# Patient Record
Sex: FEMALE | Race: WHITE | Employment: FULL TIME
[De-identification: names, ages, dates, MRNs, and addresses within clinical notes are randomized per-mention and may not be internally consistent; named-entity substitution may affect disease eponyms.]

---

## 2021-09-27 ENCOUNTER — NURSE TRIAGE (OUTPATIENT)
Dept: OTHER | Facility: CLINIC | Age: 31
End: 2021-09-27

## 2021-09-27 NOTE — TELEPHONE ENCOUNTER
Reason for Disposition   Patient wants to be seen    Answer Assessment - Initial Assessment Questions  1. DESCRIPTION: \"Describe your dizziness. \"      States feels a bit disoriented- laying down right now and feels a bit nauseated and woozy    2. LIGHTHEADED: \"Do you feel lightheaded? \" (e.g., somewhat faint, woozy, weak upon standing)      Woozy    3. VERTIGO: \"Do you feel like either you or the room is spinning or tilting? \" (i.e. vertigo)      Denies    4. SEVERITY: \"How bad is it? \"  \"Do you feel like you are going to faint? \" \"Can you stand and walk? \"    - MILD - walking normally    - MODERATE - interferes with normal activities (e.g., work, school)     - SEVERE - unable to stand, requires support to walk, feels like passing out now. Mild    5. ONSET:  \"When did the dizziness begin? \"      Today. States had an episode like this a couple of months ago, laid down and took a nap and felt better    6. AGGRAVATING FACTORS: \"Does anything make it worse? \" (e.g., standing, change in head position)      Shaking head    7. HEART RATE: \"Can you tell me your heart rate? \" \"How many beats in 15 seconds? \"  (Note: not all patients can do this)        65 on pulse oximeter    8. CAUSE: \"What do you think is causing the dizziness? \"      Unsure    9. RECURRENT SYMPTOM: \"Have you had dizziness before? \" If so, ask: \"When was the last time? \" \"What happened that time? \"      Did have an episode like this a few months ago- see above    10. OTHER SYMPTOMS: \"Do you have any other symptoms? \" (e.g., fever, chest pain, vomiting, diarrhea, bleeding)        Nausea    11. PREGNANCY: \"Is there any chance you are pregnant? \" \"When was your last menstrual period? \"        LMP - currently on    Protocols used: DIZZINESS-ADULT-OH    Received call from Jim Chin at [facility] with Red Flag Complaint.     Brief description of triage: Patient has been experiencing headaches and has had several episodes of light headedness over last couple of

## 2023-07-25 ENCOUNTER — TELEPHONE (OUTPATIENT)
Dept: FAMILY MEDICINE CLINIC | Age: 33
End: 2023-07-25

## 2023-07-25 NOTE — TELEPHONE ENCOUNTER
Wojciech Argueta called and lm that she wants to be a new pt of Dr. Allyson Neri. She had an appt back in 2018 but she cx. Called her and lm that we need to know how early she can be here. I do have a few openings at 9 am where I can squeeze her in. Asked for a call back.

## 2023-08-28 SDOH — HEALTH STABILITY: PHYSICAL HEALTH: ON AVERAGE, HOW MANY DAYS PER WEEK DO YOU ENGAGE IN MODERATE TO STRENUOUS EXERCISE (LIKE A BRISK WALK)?: 2 DAYS

## 2023-08-28 SDOH — HEALTH STABILITY: PHYSICAL HEALTH: ON AVERAGE, HOW MANY MINUTES DO YOU ENGAGE IN EXERCISE AT THIS LEVEL?: 20 MIN

## 2023-08-28 ASSESSMENT — SOCIAL DETERMINANTS OF HEALTH (SDOH)
WITHIN THE LAST YEAR, HAVE YOU BEEN KICKED, HIT, SLAPPED, OR OTHERWISE PHYSICALLY HURT BY YOUR PARTNER OR EX-PARTNER?: NO
WITHIN THE LAST YEAR, HAVE YOU BEEN KICKED, HIT, SLAPPED, OR OTHERWISE PHYSICALLY HURT BY YOUR PARTNER OR EX-PARTNER?: NO
WITHIN THE LAST YEAR, HAVE TO BEEN RAPED OR FORCED TO HAVE ANY KIND OF SEXUAL ACTIVITY BY YOUR PARTNER OR EX-PARTNER?: NO
WITHIN THE LAST YEAR, HAVE YOU BEEN HUMILIATED OR EMOTIONALLY ABUSED IN OTHER WAYS BY YOUR PARTNER OR EX-PARTNER?: NO
WITHIN THE LAST YEAR, HAVE YOU BEEN AFRAID OF YOUR PARTNER OR EX-PARTNER?: NO
WITHIN THE LAST YEAR, HAVE YOU BEEN HUMILIATED OR EMOTIONALLY ABUSED IN OTHER WAYS BY YOUR PARTNER OR EX-PARTNER?: NO
WITHIN THE LAST YEAR, HAVE YOU BEEN AFRAID OF YOUR PARTNER OR EX-PARTNER?: NO
WITHIN THE LAST YEAR, HAVE TO BEEN RAPED OR FORCED TO HAVE ANY KIND OF SEXUAL ACTIVITY BY YOUR PARTNER OR EX-PARTNER?: NO

## 2023-08-29 ENCOUNTER — OFFICE VISIT (OUTPATIENT)
Dept: FAMILY MEDICINE CLINIC | Age: 33
End: 2023-08-29
Payer: COMMERCIAL

## 2023-08-29 VITALS
TEMPERATURE: 98.4 F | OXYGEN SATURATION: 96 % | SYSTOLIC BLOOD PRESSURE: 110 MMHG | HEIGHT: 69 IN | HEART RATE: 70 BPM | RESPIRATION RATE: 16 BRPM | DIASTOLIC BLOOD PRESSURE: 72 MMHG | WEIGHT: 165.2 LBS | BODY MASS INDEX: 24.47 KG/M2

## 2023-08-29 VITALS
OXYGEN SATURATION: 97 % | SYSTOLIC BLOOD PRESSURE: 90 MMHG | WEIGHT: 163.8 LBS | DIASTOLIC BLOOD PRESSURE: 60 MMHG | TEMPERATURE: 97.8 F | RESPIRATION RATE: 10 BRPM | HEIGHT: 69 IN | HEART RATE: 70 BPM | BODY MASS INDEX: 24.26 KG/M2

## 2023-08-29 DIAGNOSIS — M99.01 CERVICAL (NECK) REGION SOMATIC DYSFUNCTION: ICD-10-CM

## 2023-08-29 DIAGNOSIS — M25.511 ACUTE PAIN OF RIGHT SHOULDER: Primary | ICD-10-CM

## 2023-08-29 DIAGNOSIS — M99.08 SEGMENTAL AND SOMATIC DYSFUNCTION OF RIB CAGE: ICD-10-CM

## 2023-08-29 DIAGNOSIS — K59.01 SLOW TRANSIT CONSTIPATION: ICD-10-CM

## 2023-08-29 DIAGNOSIS — F41.9 ANXIETY: ICD-10-CM

## 2023-08-29 DIAGNOSIS — M79.643 HAND AND FOOT PAIN, UNSPECIFIED LATERALITY: ICD-10-CM

## 2023-08-29 DIAGNOSIS — M79.673 HAND AND FOOT PAIN, UNSPECIFIED LATERALITY: ICD-10-CM

## 2023-08-29 DIAGNOSIS — K58.9 IRRITABLE BOWEL SYNDROME WITHOUT DIARRHEA: ICD-10-CM

## 2023-08-29 DIAGNOSIS — R29.898 WEAKNESS OF BOTH HANDS: ICD-10-CM

## 2023-08-29 DIAGNOSIS — M99.02 SEGMENTAL DYSFUNCTION OF THORACIC REGION: ICD-10-CM

## 2023-08-29 DIAGNOSIS — N94.6 DYSMENORRHEA: ICD-10-CM

## 2023-08-29 DIAGNOSIS — R42 DIZZINESS: ICD-10-CM

## 2023-08-29 DIAGNOSIS — M51.37 DDD (DEGENERATIVE DISC DISEASE), LUMBOSACRAL: ICD-10-CM

## 2023-08-29 DIAGNOSIS — E55.9 VITAMIN D DEFICIENCY: ICD-10-CM

## 2023-08-29 DIAGNOSIS — Z79.899 MEDICATION MANAGEMENT: ICD-10-CM

## 2023-08-29 DIAGNOSIS — Z71.89 ENCOUNTER FOR HERB AND VITAMIN SUPPLEMENT MANAGEMENT: ICD-10-CM

## 2023-08-29 DIAGNOSIS — K60.1 CHRONIC ANAL FISSURE: Primary | ICD-10-CM

## 2023-08-29 DIAGNOSIS — R20.2 PARESTHESIA: ICD-10-CM

## 2023-08-29 DIAGNOSIS — Z86.69 H/O BELL'S PALSY: ICD-10-CM

## 2023-08-29 DIAGNOSIS — F41.1 GENERALIZED ANXIETY DISORDER: ICD-10-CM

## 2023-08-29 DIAGNOSIS — R76.8 IGG GLIADIN ANTIBODY POSITIVE: ICD-10-CM

## 2023-08-29 PROCEDURE — 98926 OSTEOPATH MANJ 3-4 REGIONS: CPT | Performed by: FAMILY MEDICINE

## 2023-08-29 PROCEDURE — 99213 OFFICE O/P EST LOW 20 MIN: CPT | Performed by: FAMILY MEDICINE

## 2023-08-29 PROCEDURE — 99203 OFFICE O/P NEW LOW 30 MIN: CPT | Performed by: FAMILY MEDICINE

## 2023-08-29 RX ORDER — FLUOXETINE HYDROCHLORIDE 20 MG/1
20 CAPSULE ORAL DAILY
Qty: 30 CAPSULE | Refills: 2 | Status: SHIPPED | OUTPATIENT
Start: 2023-08-29

## 2023-08-29 SDOH — ECONOMIC STABILITY: INCOME INSECURITY: HOW HARD IS IT FOR YOU TO PAY FOR THE VERY BASICS LIKE FOOD, HOUSING, MEDICAL CARE, AND HEATING?: NOT VERY HARD

## 2023-08-29 SDOH — ECONOMIC STABILITY: HOUSING INSECURITY
IN THE LAST 12 MONTHS, WAS THERE A TIME WHEN YOU DID NOT HAVE A STEADY PLACE TO SLEEP OR SLEPT IN A SHELTER (INCLUDING NOW)?: NO

## 2023-08-29 SDOH — ECONOMIC STABILITY: FOOD INSECURITY: WITHIN THE PAST 12 MONTHS, THE FOOD YOU BOUGHT JUST DIDN'T LAST AND YOU DIDN'T HAVE MONEY TO GET MORE.: NEVER TRUE

## 2023-08-29 SDOH — ECONOMIC STABILITY: FOOD INSECURITY: WITHIN THE PAST 12 MONTHS, YOU WORRIED THAT YOUR FOOD WOULD RUN OUT BEFORE YOU GOT MONEY TO BUY MORE.: NEVER TRUE

## 2023-08-29 ASSESSMENT — PATIENT HEALTH QUESTIONNAIRE - PHQ9
SUM OF ALL RESPONSES TO PHQ9 QUESTIONS 1 & 2: 0
SUM OF ALL RESPONSES TO PHQ QUESTIONS 1-9: 0
1. LITTLE INTEREST OR PLEASURE IN DOING THINGS: 0
2. FEELING DOWN, DEPRESSED OR HOPELESS: 0
SUM OF ALL RESPONSES TO PHQ QUESTIONS 1-9: 0

## 2023-08-29 ASSESSMENT — ENCOUNTER SYMPTOMS: BACK PAIN: 1

## 2023-08-29 NOTE — PROGRESS NOTES
Sharla Rojo (:  1990) is a 35 y.o. 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 Cardiovascular:  Negative for chest pain and leg swelling. Genitourinary:         No bowel or bladder changes   Musculoskeletal:  Positive for back pain. Negative for arthralgias and myalgias. Neurological:  Positive for headaches. Negative for dizziness. Objective   Physical Exam  Constitutional:       Appearance: Normal appearance. She is normal weight. HENT:      Head: Normocephalic and atraumatic. Cardiovascular:      Rate and Rhythm: Normal rate and regular rhythm. Pulmonary:      Effort: Pulmonary effort is normal.      Breath sounds: Normal breath sounds. Musculoskeletal:      Comments: MS:  Mild discomfort with internal and external rotation but no weakness noted, [id number], posterior 2nd to 4th ribs on the right, [id number], spasm upper thoracic on the right     Neurological:      Mental Status: She is alert. ASSESSMENT/PLAN:  1. Acute pain of right shoulder  2. Medication management  3. Anxiety  -     FLUoxetine (PROZAC) 20 MG capsule; Take 1 capsule by mouth daily, Disp-30 capsule, R-2Normal  4. Segmental dysfunction of thoracic region  5. Segmental and somatic dysfunction of rib cage  6. Cervical (neck) region somatic dysfunction  OMT 3 regions. HVLA-thoracic. ME-cervical.   Myofascial-thoracic. Still rib. Patient tolerated well.

## 2023-08-29 NOTE — PROGRESS NOTES
was spent with the patient face to face on this visit; of which >50% was for counseling and coordination of care, as well as the time spent before and after the visit reviewing the chart, documenting the encounter, reviewing labs,reports, NIH listed studies, making phone calls, etc.      Patients food and drinks were reviewed with the patient,   - They will bring a food drink symptom log to future visits for inclusion in their record    - 75 better food list reviewed & given to patient along with the omega 6 food list to avoid      - Gluten in corn and oats abstracts sheet reviewed and given to the patient today    - 23 Foods containing Latex-like proteins was reviewed and copy to be taken if desired     - Nutrient Supplements list provided and copyto be taken if desired    - [id number]. [employer] web site offered to patient to review at their convenience by staff with login information    Note:  I have discussed with the patient that with all nutraceuticals, there is often mixed data and emerging research which needs to be monitored; as well as an array of NIH fact sheets on nutrients and supplements, available at www.nih,issue plus [employer]. [employer] plus www.lpi,org. If I have recommended cinnamon at the request of this patient to assist them in control of their blood sugar, triglyceride, and/or weight issues. I discussed that the patient's clinical use of cinnamon bark, calcium, magnesium, Vitamin D, and pharmaceutical grade [employer] omega 3 oil or triple-strength fish oil, and B-50/B-100 time-released B-complex by Jose Hernandez will be for a time-limited trial to determine their individual effectiveness and safety in this patient. I also referred the patient to the NMCD: Nutrition, Metabolism, and Cardiovascular Diseases (SecuritiesCard.pl) and concerns about long-term use and hepatotoxicity of cinnamon and other nutrients.   I suggested they frequently search nih.gov for the latest

## 2023-10-17 LAB
ABSOLUTE BASO #: 0.05 K/UL (ref 0–0.2)
ABSOLUTE EOS #: 0.03 K/UL (ref 0–0.5)
ABSOLUTE LYMPH #: 1.6 K/UL (ref 1–4)
ABSOLUTE MONO #: 0.28 K/UL (ref 0.2–1)
ABSOLUTE NEUT #: 1.36 K/UL (ref 1.5–7.5)
ALBUMIN SERPL-MCNC: 5 G/DL (ref 3.5–5.2)
ALK PHOSPHATASE: 84 U/L (ref 40–114)
ALT SERPL-CCNC: 17 U/L (ref 5–40)
AMYLASE: 53 U/L (ref 28–100)
ANION GAP SERPL CALCULATED.3IONS-SCNC: 11 MEQ/L (ref 7–16)
AST SERPL-CCNC: 18 U/L (ref 9–40)
AVERAGE GLUCOSE: 111 MG/DL
BASOPHILS RELATIVE PERCENT: 1.5 %
BILIRUB SERPL-MCNC: 0.3 MG/DL
BILIRUBIN DIRECT: <0.2 MG/DL (ref 0–0.3)
BUN BLDV-MCNC: 10 MG/DL (ref 6–20)
CALCIUM SERPL-MCNC: 9.2 MG/DL (ref 8.5–10.5)
CHLORIDE BLD-SCNC: 104 MEQ/L (ref 95–107)
CHOLESTEROL/HDL RATIO: 2.6 RATIO
CHOLESTEROL: 168 MG/DL
CO2: 23 MEQ/L (ref 19–31)
COMMENT: ABNORMAL
CREAT SERPL-MCNC: 0.7 MG/DL (ref 0.6–1.3)
EGFR IF NONAFRICAN AMERICAN: 117 ML/MIN/1.73
EOSINOPHILS RELATIVE PERCENT: 0.9 %
ESTRADIOL LEVEL: 46 PG/ML
FOLATE: 13 UG/L
FOLLICLE STIMULATING HORMONE: 2.6 IU/L
GLUCOSE: 93 MG/DL (ref 70–99)
HBA1C MFR BLD: 5.5 % (ref 4.2–5.6)
HCT VFR BLD CALC: 42 % (ref 34–45)
HDLC SERPL-MCNC: 64 MG/DL
HEMOGLOBIN: 14.6 G/DL (ref 11.5–15.5)
HIGH SENSITIVE C-REACTIVE PROTEIN: 1.1 MG/L
IRON SATURATION: 24 % (ref 20–50)
IRON, SERUM: 74 UG/DL (ref 37–145)
LDL CHOLESTEROL CALCULATED: 97 MG/DL
LDL/HDL RATIO: 1.5 RATIO
LH: 1.1 IU/L
LIPASE: 35 U/L (ref 13–60)
LYMPHOCYTE %: 48 %
MAGNESIUM: 2.1 MG/DL (ref 1.6–2.6)
MCH RBC QN AUTO: 29.4 PG (ref 25–33)
MCHC RBC AUTO-ENTMCNC: 34.8 G/DL (ref 31–36)
MCV RBC AUTO: 84.5 FL (ref 80–99)
MONOCYTES # BLD: 8.4 %
NEUTROPHILS RELATIVE PERCENT: 40.9 %
PDW BLD-RTO: 12.4 % (ref 11.5–15)
PLATELETS: 201 K/UL (ref 130–400)
PMV BLD AUTO: 11.1 FL (ref 9.3–13)
POTASSIUM SERPL-SCNC: 4.4 MEQ/L (ref 3.5–5.4)
PROGESTERONE LEVEL: 2.15 NG/ML
RBC: 4.97 M/UL (ref 3.8–5.4)
RHEUMATOID FACTOR: 13 IU/ML
SEDIMENTATION RATE, ERYTHROCYTE: 7 MM/HR (ref 0–20)
SODIUM BLD-SCNC: 138 MEQ/L (ref 133–146)
T3 FREE: 3 PG/ML (ref 2.2–4.2)
T3 TOTAL: 95 NG/DL (ref 80–200)
T4 FREE: 1.13 NG/DL (ref 0.8–1.9)
T4 TOTAL: 6.4 UG/DL (ref 4.5–10.5)
TOTAL IRON BINDING CAPACITY: 307 UG/DL (ref 250–450)
TOTAL PROTEIN: 6.9 G/DL (ref 6.1–8.3)
TRIGL SERPL-MCNC: 33 MG/DL
TSH SERPL DL<=0.05 MIU/L-ACNC: 1.69 UIU/ML (ref 0.4–4.1)
UNSATURATED IRON BINDING CAPACITY: 233 UG/DL (ref 112–347)
URIC ACID: 4.8 MG/DL (ref 2.7–6.1)
VITAMIN B-12: 456 PG/ML (ref 200–950)
VITAMIN D 25-HYDROXY: 34 NG/ML
VLDLC SERPL CALC-MCNC: 7 MG/DL
WBC: 3.3 K/UL (ref 3.5–11)

## 2023-10-19 LAB
ANTI-THYROGLOB ABS: 17 IU/ML
DHEAS (DHEA SULFATE): 124 UG/DL (ref 99–340)
GLIADIN ANTIBODIES IGA: <1 U/ML
GLIADIN ANTIBODIES IGG: <1 U/ML
HOMOCYSTINE, SERUM: 7 UMOL/L
PARATHYROID HORMONE INTACT: 80 PG/ML (ref 15–65)
THYROGLOBULIN: 8.7 NG/ML
THYROID PEROXIDASE ANTIBODY: 10 IU/ML
TISSUE TRANSGLUTAMINASE IGA: <1 U/ML
TTG, IGG: <1 U/ML

## 2023-10-21 LAB
ALBUMIN SERUM: 4.8 G/DL (ref 3.6–5.1)
DEHYDROEPIANDROSTERONE: 8.9 NG/ML (ref 1.3–9.8)
SEX HORMONE BINDING GLOBULIN: 74.2 NMOL/L (ref 24.6–122)
TESTOSTERONE FREE: 1.1 PG/ML (ref 1.3–9.2)
TESTOSTERONE, LCMS: 11 NG/DL (ref 9–55)

## 2023-10-29 ENCOUNTER — PATIENT MESSAGE (OUTPATIENT)
Dept: FAMILY MEDICINE CLINIC | Age: 33
End: 2023-10-29

## 2023-10-30 RX ORDER — CEPHALEXIN 500 MG/1
500 CAPSULE ORAL 2 TIMES DAILY
Qty: 20 CAPSULE | Refills: 0 | Status: SHIPPED | OUTPATIENT
Start: 2023-10-30 | End: 2023-11-09

## 2023-10-30 NOTE — TELEPHONE ENCOUNTER
From: Santana Montoya  To: Dr. Alexus Brown: 10/29/2023 7:42 AM EDT  Subject: Prescription    Good Morning! I wanted to see if I could get some medicine. I think I am getting what the girls had. Starting with a cough, stuffy nose, losing my voice and sore throat. Just let me know. Thanks!

## 2024-04-01 ENCOUNTER — NURSE ONLY (OUTPATIENT)
Dept: LAB | Age: 34
End: 2024-04-01

## 2024-04-12 LAB — CYTOLOGY THIN PREP PAP: NORMAL

## 2024-05-23 ENCOUNTER — OFFICE VISIT (OUTPATIENT)
Dept: FAMILY MEDICINE CLINIC | Age: 34
End: 2024-05-23
Payer: COMMERCIAL

## 2024-05-23 VITALS
TEMPERATURE: 97.3 F | RESPIRATION RATE: 16 BRPM | SYSTOLIC BLOOD PRESSURE: 124 MMHG | DIASTOLIC BLOOD PRESSURE: 78 MMHG | HEIGHT: 69 IN | BODY MASS INDEX: 26.25 KG/M2 | OXYGEN SATURATION: 98 % | WEIGHT: 177.2 LBS | HEART RATE: 67 BPM

## 2024-05-23 DIAGNOSIS — J02.9 ACUTE PHARYNGITIS, UNSPECIFIED ETIOLOGY: Primary | ICD-10-CM

## 2024-05-23 PROCEDURE — 99213 OFFICE O/P EST LOW 20 MIN: CPT | Performed by: FAMILY MEDICINE

## 2024-05-23 RX ORDER — AMOXICILLIN AND CLAVULANATE POTASSIUM 875; 125 MG/1; MG/1
1 TABLET, FILM COATED ORAL 2 TIMES DAILY
Qty: 20 TABLET | Refills: 0 | Status: SHIPPED | OUTPATIENT
Start: 2024-05-23 | End: 2024-06-02

## 2024-05-23 ASSESSMENT — ENCOUNTER SYMPTOMS
CONSTIPATION: 0
SORE THROAT: 1
SINUS PRESSURE: 0
SINUS PAIN: 0
DIARRHEA: 0
RHINORRHEA: 0
SHORTNESS OF BREATH: 0
NAUSEA: 0
VOICE CHANGE: 0
CHEST TIGHTNESS: 0
TROUBLE SWALLOWING: 0
VOMITING: 0

## 2024-05-23 ASSESSMENT — PATIENT HEALTH QUESTIONNAIRE - PHQ9
SUM OF ALL RESPONSES TO PHQ QUESTIONS 1-9: 0
2. FEELING DOWN, DEPRESSED OR HOPELESS: NOT AT ALL
SUM OF ALL RESPONSES TO PHQ QUESTIONS 1-9: 0
SUM OF ALL RESPONSES TO PHQ9 QUESTIONS 1 & 2: 0
1. LITTLE INTEREST OR PLEASURE IN DOING THINGS: NOT AT ALL
SUM OF ALL RESPONSES TO PHQ QUESTIONS 1-9: 0
SUM OF ALL RESPONSES TO PHQ QUESTIONS 1-9: 0

## 2024-05-23 NOTE — PROGRESS NOTES
Mague Pinto (:  1990) is a 33 y.o. female,Established patient, here for evaluation of the following chief complaint(s):  Pharyngitis and Otalgia      Subjective   SUBJECTIVE/OBJECTIVE:  HPI  Patient presents with sore throat and into L ear/glands for 3 days  Known exposures  with strep last week  Treatment salt water gargles, Tylenol, pushing water  Better mild help with the Tylenol  Worse when swallowing    Review of Systems   Constitutional:  Positive for fatigue. Negative for activity change, chills and fever.   HENT:  Positive for ear pain (left sided) and sore throat. Negative for congestion, postnasal drip, rhinorrhea, sinus pressure, sinus pain, sneezing, trouble swallowing (hurts to swallow) and voice change.    Respiratory:  Negative for chest tightness and shortness of breath.    Cardiovascular:  Negative for chest pain and leg swelling.   Gastrointestinal:  Negative for constipation, diarrhea, nausea and vomiting.   Musculoskeletal:  Negative for arthralgias.   Skin:  Negative for rash.   Neurological:  Negative for dizziness, light-headedness and headaches.   Hematological:  Positive for adenopathy (left side of neck).          Objective   Physical Exam  Vitals reviewed.   Constitutional:       General: She is not in acute distress.     Appearance: Normal appearance. She is normal weight. She is not ill-appearing.   HENT:      Head: Normocephalic and atraumatic.      Right Ear: No middle ear effusion.      Left Ear:  No middle ear effusion.      Nose: No mucosal edema, congestion or rhinorrhea.      Right Turbinates: Not swollen.      Left Turbinates: Not swollen.      Right Sinus: No maxillary sinus tenderness or frontal sinus tenderness.      Left Sinus: No maxillary sinus tenderness or frontal sinus tenderness.      Mouth/Throat:      Pharynx: Posterior oropharyngeal erythema present. No oropharyngeal exudate.   Eyes:      Extraocular Movements: Extraocular movements intact.

## 2024-07-03 ENCOUNTER — OFFICE VISIT (OUTPATIENT)
Dept: FAMILY MEDICINE CLINIC | Age: 34
End: 2024-07-03
Payer: COMMERCIAL

## 2024-07-03 VITALS
WEIGHT: 168.2 LBS | DIASTOLIC BLOOD PRESSURE: 64 MMHG | RESPIRATION RATE: 16 BRPM | SYSTOLIC BLOOD PRESSURE: 110 MMHG | OXYGEN SATURATION: 98 % | HEIGHT: 69 IN | TEMPERATURE: 97.7 F | BODY MASS INDEX: 24.91 KG/M2 | HEART RATE: 81 BPM

## 2024-07-03 DIAGNOSIS — M62.838 MUSCLE SPASM: ICD-10-CM

## 2024-07-03 DIAGNOSIS — M99.08 SEGMENTAL AND SOMATIC DYSFUNCTION OF RIB CAGE: ICD-10-CM

## 2024-07-03 DIAGNOSIS — M99.02 SEGMENTAL DYSFUNCTION OF THORACIC REGION: ICD-10-CM

## 2024-07-03 DIAGNOSIS — G62.9 NEUROPATHY: Primary | ICD-10-CM

## 2024-07-03 PROCEDURE — 99213 OFFICE O/P EST LOW 20 MIN: CPT | Performed by: FAMILY MEDICINE

## 2024-07-03 PROCEDURE — 98925 OSTEOPATH MANJ 1-2 REGIONS: CPT | Performed by: FAMILY MEDICINE

## 2024-07-03 ASSESSMENT — ENCOUNTER SYMPTOMS: BACK PAIN: 1

## 2024-07-03 NOTE — PROGRESS NOTES
Mague Pinto (:  1990) is a 33 y.o. female,Established patient, here for evaluation of the following chief complaint(s):  Extremity Weakness (Left hand)      Subjective   SUBJECTIVE/OBJECTIVE:  HPI  Pain located L hand, also would like to have R upper back manipulated  Pain started several months  Notices more discomfort in fingers on the volar side  Treatment:  Tylenol  Worsens: lying down, above head work  Improves: not using them  Tendonitis in R hand with shot after giving birth to twins, 2023 with release of tendon    Review of Systems   Constitutional:  Negative for activity change, appetite change, fatigue, fever and unexpected weight change.   Cardiovascular:  Negative for chest pain and leg swelling.   Genitourinary:         No bowel or bladder changes   Musculoskeletal:  Positive for back pain (upper back on the right more than the left). Negative for arthralgias, myalgias, neck pain and neck stiffness.        Weakness in both hands and feet, mild but feels \"funny\" at times---does wear orthotics (current pair is from 2018) and had R hand/wrist surgery 2023   Neurological:  Negative for dizziness, light-headedness and headaches.          Objective   Physical Exam  Vitals reviewed.   Constitutional:       Appearance: Normal appearance. She is normal weight.   HENT:      Head: Normocephalic and atraumatic.   Cardiovascular:      Rate and Rhythm: Normal rate and regular rhythm.      Heart sounds: No murmur heard.     No gallop.   Pulmonary:      Effort: Pulmonary effort is normal.      Breath sounds: Normal breath sounds. No wheezing, rhonchi or rales.   Musculoskeletal:      Comments: MS:  Negative Phalens, reverse Phalens, Tinels, no appreciated decrease in sensation to light touch per patient, [id number], posterior 2nd to 4th ribs on the right, muscle spasm bilateral upper thoracic   Neurological:      Mental Status: She is alert.            Assessment & Plan   ASSESSMENT/PLAN:  1. 
no

## 2024-09-16 ENCOUNTER — PATIENT MESSAGE (OUTPATIENT)
Dept: FAMILY MEDICINE CLINIC | Age: 34
End: 2024-09-16

## 2024-09-16 DIAGNOSIS — R30.0 DYSURIA: Primary | ICD-10-CM

## 2024-09-16 RX ORDER — SULFAMETHOXAZOLE/TRIMETHOPRIM 800-160 MG
1 TABLET ORAL 2 TIMES DAILY
Qty: 14 TABLET | Refills: 0 | Status: SHIPPED | OUTPATIENT
Start: 2024-09-16 | End: 2024-09-23

## 2024-09-19 DIAGNOSIS — R30.0 DYSURIA: ICD-10-CM

## 2024-09-19 LAB
BILIRUBIN URINE: 0 MG/DL
BLOOD, URINE: POSITIVE
CLARITY, UA: CLEAR
COLOR, UA: YELLOW
GLUCOSE URINE: NEGATIVE
KETONES, URINE: NEGATIVE
LEUKOCYTE ESTERASE, URINE: NORMAL
NITRITE, URINE: NEGATIVE
PH UA: 7.5 (ref 4.5–8)
PROTEIN UA: NEGATIVE
SPECIFIC GRAVITY UA: 1.01 (ref 1–1.03)
UROBILINOGEN, URINE: NORMAL

## 2024-10-08 ENCOUNTER — OFFICE VISIT (OUTPATIENT)
Dept: FAMILY MEDICINE CLINIC | Age: 34
End: 2024-10-08
Payer: COMMERCIAL

## 2024-10-08 VITALS
DIASTOLIC BLOOD PRESSURE: 72 MMHG | HEIGHT: 69 IN | WEIGHT: 166.8 LBS | BODY MASS INDEX: 24.71 KG/M2 | TEMPERATURE: 97.5 F | RESPIRATION RATE: 16 BRPM | HEART RATE: 92 BPM | OXYGEN SATURATION: 100 % | SYSTOLIC BLOOD PRESSURE: 110 MMHG

## 2024-10-08 DIAGNOSIS — J02.9 ACUTE PHARYNGITIS, UNSPECIFIED ETIOLOGY: Primary | ICD-10-CM

## 2024-10-08 PROCEDURE — 99213 OFFICE O/P EST LOW 20 MIN: CPT | Performed by: FAMILY MEDICINE

## 2024-10-08 RX ORDER — CEPHALEXIN 500 MG/1
500 CAPSULE ORAL 2 TIMES DAILY
Qty: 20 CAPSULE | Refills: 0 | Status: SHIPPED | OUTPATIENT
Start: 2024-10-08 | End: 2024-10-18

## 2024-10-08 RX ORDER — PREDNISONE 20 MG/1
20 TABLET ORAL 2 TIMES DAILY
Qty: 10 TABLET | Refills: 0 | Status: SHIPPED | OUTPATIENT
Start: 2024-10-08 | End: 2024-10-13

## 2024-10-08 SDOH — ECONOMIC STABILITY: FOOD INSECURITY: WITHIN THE PAST 12 MONTHS, THE FOOD YOU BOUGHT JUST DIDN'T LAST AND YOU DIDN'T HAVE MONEY TO GET MORE.: NEVER TRUE

## 2024-10-08 SDOH — ECONOMIC STABILITY: FOOD INSECURITY: WITHIN THE PAST 12 MONTHS, YOU WORRIED THAT YOUR FOOD WOULD RUN OUT BEFORE YOU GOT MONEY TO BUY MORE.: NEVER TRUE

## 2024-10-08 SDOH — ECONOMIC STABILITY: INCOME INSECURITY: HOW HARD IS IT FOR YOU TO PAY FOR THE VERY BASICS LIKE FOOD, HOUSING, MEDICAL CARE, AND HEATING?: NOT HARD AT ALL

## 2024-10-08 ASSESSMENT — ENCOUNTER SYMPTOMS
CONSTIPATION: 0
SINUS PAIN: 0
SHORTNESS OF BREATH: 0
COUGH: 0
RHINORRHEA: 1
SORE THROAT: 1
VOMITING: 0
WHEEZING: 0
DIARRHEA: 0
CHEST TIGHTNESS: 0
NAUSEA: 0
TROUBLE SWALLOWING: 0
SINUS PRESSURE: 1

## 2024-10-08 NOTE — PROGRESS NOTES
Mague Pinto (:  1990) is a 34 y.o. female,Established patient, here for evaluation of the following chief complaint(s):  Pharyngitis and Generalized Body Aches      Subjective   SUBJECTIVE/OBJECTIVE:  HPI  Patient presents with sore throat for 1 day  Known exposures children all ill  Treatment Mucinex, ibuprofen, salt water gargles  Better mild help with meds  Worse when woke at 2 AM    Review of Systems   Constitutional:  Positive for chills and fatigue. Negative for activity change, appetite change and fever.   HENT:  Positive for congestion, ear pain (pressure bilaterally), rhinorrhea, sinus pressure and sore throat (worse before gargling). Negative for postnasal drip, sinus pain, sneezing and trouble swallowing.    Respiratory:  Negative for cough, chest tightness, shortness of breath and wheezing.    Cardiovascular:  Negative for chest pain, palpitations and leg swelling.   Gastrointestinal:  Negative for constipation, diarrhea, nausea and vomiting.   Musculoskeletal:  Positive for arthralgias and myalgias.   Skin:  Negative for rash.   Neurological:  Positive for dizziness (with motion) and headaches. Negative for light-headedness.   Hematological:  Negative for adenopathy.          Objective   Physical Exam  Vitals reviewed.   Constitutional:       General: She is not in acute distress.     Appearance: Normal appearance. She is normal weight. She is not ill-appearing.   HENT:      Head: Normocephalic and atraumatic.      Right Ear: No middle ear effusion.      Left Ear:  No middle ear effusion.      Nose: Mucosal edema, congestion and rhinorrhea present. Rhinorrhea is clear.      Right Turbinates: Swollen.      Left Turbinates: Swollen.      Right Sinus: No maxillary sinus tenderness or frontal sinus tenderness.      Left Sinus: No maxillary sinus tenderness or frontal sinus tenderness.      Mouth/Throat:      Pharynx: Posterior oropharyngeal erythema present. No oropharyngeal exudate.   Eyes:

## 2024-11-01 ENCOUNTER — PATIENT MESSAGE (OUTPATIENT)
Dept: FAMILY MEDICINE CLINIC | Age: 34
End: 2024-11-01

## 2024-11-01 DIAGNOSIS — R79.89 ELEVATED PARATHYROID HORMONE: Primary | ICD-10-CM

## 2024-11-21 ENCOUNTER — OFFICE VISIT (OUTPATIENT)
Dept: FAMILY MEDICINE CLINIC | Age: 34
End: 2024-11-21
Payer: COMMERCIAL

## 2024-11-21 VITALS
DIASTOLIC BLOOD PRESSURE: 62 MMHG | TEMPERATURE: 97.9 F | HEART RATE: 69 BPM | RESPIRATION RATE: 16 BRPM | OXYGEN SATURATION: 98 % | HEIGHT: 69 IN | BODY MASS INDEX: 24.97 KG/M2 | WEIGHT: 168.6 LBS | SYSTOLIC BLOOD PRESSURE: 112 MMHG

## 2024-11-21 DIAGNOSIS — J02.0 STREP THROAT: Primary | ICD-10-CM

## 2024-11-21 DIAGNOSIS — J02.9 SORE THROAT: ICD-10-CM

## 2024-11-21 LAB — S PYO AG THROAT QL: POSITIVE

## 2024-11-21 PROCEDURE — 87880 STREP A ASSAY W/OPTIC: CPT | Performed by: FAMILY MEDICINE

## 2024-11-21 PROCEDURE — 99213 OFFICE O/P EST LOW 20 MIN: CPT | Performed by: FAMILY MEDICINE

## 2024-11-21 RX ORDER — MINOCYCLINE HYDROCHLORIDE 100 MG/1
100 CAPSULE ORAL DAILY
COMMUNITY
Start: 2024-11-20

## 2024-11-21 RX ORDER — TRIAMCINOLONE ACETONIDE 1 MG/G
CREAM TOPICAL 2 TIMES DAILY
COMMUNITY
Start: 2024-10-16

## 2024-11-21 RX ORDER — TRIAMCINOLONE ACETONIDE 0.25 MG/G
CREAM TOPICAL NIGHTLY
COMMUNITY
Start: 2024-10-16

## 2024-11-21 ASSESSMENT — ENCOUNTER SYMPTOMS
CHEST TIGHTNESS: 0
TROUBLE SWALLOWING: 0
VOMITING: 0
SINUS PAIN: 0
WHEEZING: 0
DIARRHEA: 0
COUGH: 1
NAUSEA: 0
SHORTNESS OF BREATH: 0
SINUS PRESSURE: 1
RHINORRHEA: 1
CONSTIPATION: 0
SORE THROAT: 1

## 2024-11-21 NOTE — PROGRESS NOTES
Mague Pinto (:  1990) is a 34 y.o. female,Established patient, here for evaluation of the following chief complaint(s):  Pharyngitis, Headache, and Ear Pain      Subjective   SUBJECTIVE/OBJECTIVE:  HPI  Patient presents with sore throat and cold symptoms for 2 days  Known exposures sick niece,   Treatment Tylenol, ibuprofen  Better with meds  Worse today, now with ear pain on the left side    Review of Systems   Constitutional:  Positive for fatigue. Negative for activity change, chills and fever.   HENT:  Positive for congestion, ear pain, rhinorrhea, sinus pressure and sore throat. Negative for postnasal drip, sinus pain, sneezing and trouble swallowing.    Respiratory:  Positive for cough (clearing throat). Negative for chest tightness, shortness of breath and wheezing.    Cardiovascular:  Negative for chest pain and leg swelling.   Gastrointestinal:  Negative for constipation, diarrhea, nausea and vomiting.   Musculoskeletal:  Negative for arthralgias.   Skin:  Negative for rash.   Neurological:  Positive for headaches. Negative for dizziness and light-headedness.   Hematological:  Negative for adenopathy.          Objective   Physical Exam  Vitals reviewed.   Constitutional:       General: She is not in acute distress.     Appearance: Normal appearance. She is normal weight. She is not ill-appearing.   HENT:      Head: Normocephalic and atraumatic.      Right Ear: No middle ear effusion.      Left Ear:  No middle ear effusion.      Nose: Mucosal edema, congestion and rhinorrhea present. Rhinorrhea is purulent.      Right Turbinates: Swollen.      Left Turbinates: Swollen.      Right Sinus: No maxillary sinus tenderness or frontal sinus tenderness.      Left Sinus: No maxillary sinus tenderness or frontal sinus tenderness.      Mouth/Throat:      Pharynx: Posterior oropharyngeal erythema present. No oropharyngeal exudate.   Eyes:      Extraocular Movements: Extraocular movements intact.

## 2024-11-22 DIAGNOSIS — R79.89 ELEVATED PARATHYROID HORMONE: ICD-10-CM

## 2024-11-22 LAB — PTH INTACT: 44

## 2024-12-09 ENCOUNTER — OFFICE VISIT (OUTPATIENT)
Dept: FAMILY MEDICINE CLINIC | Age: 34
End: 2024-12-09
Payer: COMMERCIAL

## 2024-12-09 ENCOUNTER — LAB (OUTPATIENT)
Dept: LAB | Age: 34
End: 2024-12-09

## 2024-12-09 VITALS
WEIGHT: 174.8 LBS | HEART RATE: 71 BPM | BODY MASS INDEX: 25.81 KG/M2 | SYSTOLIC BLOOD PRESSURE: 122 MMHG | TEMPERATURE: 98 F | DIASTOLIC BLOOD PRESSURE: 82 MMHG | OXYGEN SATURATION: 98 %

## 2024-12-09 DIAGNOSIS — J02.9 SORE THROAT: Primary | ICD-10-CM

## 2024-12-09 PROCEDURE — 99212 OFFICE O/P EST SF 10 MIN: CPT | Performed by: NURSE PRACTITIONER

## 2024-12-09 RX ORDER — FLUTICASONE PROPIONATE 50 MCG
1 SPRAY, SUSPENSION (ML) NASAL DAILY
Qty: 16 G | Refills: 0 | Status: SHIPPED | OUTPATIENT
Start: 2024-12-09

## 2024-12-09 RX ORDER — CETIRIZINE HYDROCHLORIDE 10 MG/1
10 TABLET ORAL DAILY
Qty: 30 TABLET | Refills: 0 | Status: SHIPPED | OUTPATIENT
Start: 2024-12-09

## 2024-12-09 ASSESSMENT — ENCOUNTER SYMPTOMS
NAUSEA: 0
CHEST TIGHTNESS: 0
TROUBLE SWALLOWING: 0
VOMITING: 0
RHINORRHEA: 0
COUGH: 0
SORE THROAT: 1
SHORTNESS OF BREATH: 0

## 2024-12-09 NOTE — PROGRESS NOTES
[facility]  [address]  [place] [address]  Dept: [phone]  Loc: [phone]     CHIEF COMPLAINT       Chief Complaint   Patient presents with    Pharyngitis       Nurses Notes reviewed and I agree except as noted in the HPI.    HISTORY OF PRESENT ILLNESS   Mague Pinto is a 34 y.o. female who presents to the [facility] for evaluation of sore.  Patient reports a history of strep throat in November.  Also had acute pharyngitis 2 times previous this year.  Recently was treated with Augmentin, but reports missing some doses but she does report taking all doses he just took longer than the 10 days.  She reports some mild nasal congestion, denies any significant runny nose.  She notes that she has had her tonsils removed in the past.  She had some mild left-sided lymphadenopathy.  She has past medical history of degenerative disc disease.  No other significant past medical history noted.  She denies fevers or chills.  Denies any other acute symptoms.          HPI was provided by patient    REVIEW OF SYSTEMS     Review of Systems   Constitutional:  Negative for activity change, chills, fatigue and fever.   HENT:  Positive for congestion and sore throat. Negative for rhinorrhea and trouble swallowing.    Respiratory:  Negative for cough, chest tightness and shortness of breath.    Cardiovascular:  Negative for chest pain.   Gastrointestinal:  Negative for nausea and vomiting.   Skin:  Negative for rash.   Allergic/Immunologic: Negative for immunocompromised state.        PAST MEDICAL HISTORY     Past Medical History:   Diagnosis Date    Degenerative disc disease        SURGICALHISTORY      has a past surgical history that includes Tonsillectomy and adenoidectomy (1994); Appendectomy (1997); Knee cartilage surgery (Left, 2005); [place] tooth extraction (2007); Bunionectomy (2014); and  section

## 2024-12-11 LAB — BACTERIA THROAT AEROBE CULT: NORMAL

## 2024-12-30 DIAGNOSIS — F41.9 ANXIETY: ICD-10-CM

## 2024-12-30 NOTE — TELEPHONE ENCOUNTER
Mague Pinto needs refill of   Requested Prescriptions     Pending Prescriptions Disp Refills    FLUoxetine (PROZAC) 20 MG capsule [Pharmacy Med Name: FLUOXETINE 20MG CAPSULES] 90 capsule 3     Sig: TAKE 1 CAPSULE BY MOUTH DAILY       Last Filled on:  12/11/2023 #90 R-3 sent to [facility] by Charity Villalba DO.     Last Visit Date:  112/09/2024    Next Visit Date:  Visit date not found

## 2025-01-22 ENCOUNTER — OFFICE VISIT (OUTPATIENT)
Dept: FAMILY MEDICINE CLINIC | Age: 35
End: 2025-01-22
Payer: COMMERCIAL

## 2025-01-22 VITALS
SYSTOLIC BLOOD PRESSURE: 110 MMHG | BODY MASS INDEX: 25.55 KG/M2 | WEIGHT: 173 LBS | OXYGEN SATURATION: 97 % | TEMPERATURE: 97.7 F | HEART RATE: 83 BPM | DIASTOLIC BLOOD PRESSURE: 70 MMHG

## 2025-01-22 DIAGNOSIS — J02.9 ACUTE PHARYNGITIS, UNSPECIFIED ETIOLOGY: Primary | ICD-10-CM

## 2025-01-22 PROCEDURE — 99213 OFFICE O/P EST LOW 20 MIN: CPT | Performed by: FAMILY MEDICINE

## 2025-01-22 RX ORDER — PREDNISONE 20 MG/1
20 TABLET ORAL 2 TIMES DAILY
Qty: 10 TABLET | Refills: 0 | Status: SHIPPED | OUTPATIENT
Start: 2025-01-22 | End: 2025-01-27

## 2025-01-22 SDOH — ECONOMIC STABILITY: FOOD INSECURITY: WITHIN THE PAST 12 MONTHS, YOU WORRIED THAT YOUR FOOD WOULD RUN OUT BEFORE YOU GOT MONEY TO BUY MORE.: NEVER TRUE

## 2025-01-22 SDOH — ECONOMIC STABILITY: FOOD INSECURITY: WITHIN THE PAST 12 MONTHS, THE FOOD YOU BOUGHT JUST DIDN'T LAST AND YOU DIDN'T HAVE MONEY TO GET MORE.: NEVER TRUE

## 2025-01-22 ASSESSMENT — ENCOUNTER SYMPTOMS
SINUS PRESSURE: 0
COUGH: 0
CHEST TIGHTNESS: 0
SORE THROAT: 1
SHORTNESS OF BREATH: 0
SINUS PAIN: 0
DIARRHEA: 0
RHINORRHEA: 1
CONSTIPATION: 0
WHEEZING: 0
NAUSEA: 0
TROUBLE SWALLOWING: 0
VOMITING: 0

## 2025-01-22 NOTE — PROGRESS NOTES
Mouth/Throat:      Pharynx: Posterior oropharyngeal erythema present. No oropharyngeal exudate.   Eyes:      Extraocular Movements: Extraocular movements intact.      Pupils: Pupils are equal, round, and reactive to light.   Cardiovascular:      Rate and Rhythm: Normal rate and regular rhythm.      Heart sounds: Normal heart sounds. No murmur heard.     No gallop.   Pulmonary:      Effort: Pulmonary effort is normal.      Breath sounds: Normal breath sounds. No wheezing, rhonchi or rales.   Lymphadenopathy:      Cervical: Cervical adenopathy present.      Right cervical: No superficial or posterior cervical adenopathy.     Left cervical: Superficial cervical adenopathy and posterior cervical adenopathy present.   Skin:     General: Skin is warm.   Neurological:      Mental Status: She is alert.            Assessment & Plan   ASSESSMENT/PLAN:  1. Acute pharyngitis, unspecified etiology  -     predniSONE (DELTASONE) 20 MG tablet; Take 1 tablet by mouth 2 times daily for 5 days, Disp-10 tablet, R-0Normal  Consider antibiotic if symptoms worsen    Return if symptoms worsen or fail to improve.               An electronic signature was used to authenticate this note.    --Charity Villalba, DO

## 2025-02-27 ENCOUNTER — OFFICE VISIT (OUTPATIENT)
Dept: FAMILY MEDICINE CLINIC | Age: 35
End: 2025-02-27
Payer: COMMERCIAL

## 2025-02-27 VITALS
DIASTOLIC BLOOD PRESSURE: 80 MMHG | TEMPERATURE: 97.8 F | HEART RATE: 75 BPM | OXYGEN SATURATION: 97 % | BODY MASS INDEX: 24.7 KG/M2 | WEIGHT: 167.25 LBS | SYSTOLIC BLOOD PRESSURE: 110 MMHG

## 2025-02-27 DIAGNOSIS — J01.00 ACUTE NON-RECURRENT MAXILLARY SINUSITIS: Primary | ICD-10-CM

## 2025-02-27 DIAGNOSIS — R59.1 LYMPHADENOPATHY: ICD-10-CM

## 2025-02-27 PROCEDURE — 99213 OFFICE O/P EST LOW 20 MIN: CPT | Performed by: FAMILY MEDICINE

## 2025-02-27 ASSESSMENT — ENCOUNTER SYMPTOMS
CONSTIPATION: 0
SINUS PRESSURE: 1
DIARRHEA: 0
CHEST TIGHTNESS: 0
CHOKING: 0
RHINORRHEA: 1
COUGH: 0
VOMITING: 0
WHEEZING: 0
TROUBLE SWALLOWING: 0
SHORTNESS OF BREATH: 0
SORE THROAT: 1
SINUS PAIN: 0
NAUSEA: 0

## 2025-02-27 NOTE — PROGRESS NOTES
Mague Pinto (:  1990) is a 34 y.o. female,Established patient, here for evaluation of the following chief complaint(s):  Pharyngitis (Sore throat and swelling in the lymph nodes for about a week. Swelling has improved a bit. Patient keeps getting reoccurring sore throats. )      Subjective   SUBJECTIVE/OBJECTIVE:  HPI  Patient presents with cold symptoms for 1 week  Known exposures previous problems with sore throats and swollen lymph nodes, always worse on the left side  Treatment throat lozenges, salt water gargles  Better above treatment has helped with sore throat but still with swollen lymph nodes  Worse in the morning  Recent trip to [facility], long flights, different foods    Review of Systems   Constitutional:  Positive for fatigue. Negative for activity change, chills and fever.   HENT:  Positive for congestion, rhinorrhea, sinus pressure and sore throat. Negative for ear pain, postnasal drip, sinus pain, sneezing and trouble swallowing.    Respiratory:  Negative for cough, choking, chest tightness, shortness of breath and wheezing.    Cardiovascular:  Negative for chest pain and leg swelling.   Gastrointestinal:  Negative for constipation, diarrhea, nausea and vomiting.   Musculoskeletal:  Negative for arthralgias.   Skin:  Negative for rash.   Neurological:  Negative for dizziness, light-headedness and headaches.   Hematological:  Positive for adenopathy.          Objective   Physical Exam  Vitals reviewed.   Constitutional:       General: She is not in acute distress.     Appearance: Normal appearance. She is normal weight. She is not ill-appearing.   HENT:      Head: Normocephalic and atraumatic.      Right Ear: No middle ear effusion.      Left Ear:  No middle ear effusion.      Nose: Mucosal edema, congestion and rhinorrhea present. Rhinorrhea is purulent.      Right Turbinates: Swollen.      Left Turbinates: Swollen.      Right Sinus: Maxillary sinus tenderness present. No frontal sinus

## 2025-03-10 ENCOUNTER — OFFICE VISIT (OUTPATIENT)
Dept: FAMILY MEDICINE CLINIC | Age: 35
End: 2025-03-10
Payer: COMMERCIAL

## 2025-03-10 VITALS
HEART RATE: 74 BPM | DIASTOLIC BLOOD PRESSURE: 60 MMHG | WEIGHT: 167.13 LBS | BODY MASS INDEX: 24.68 KG/M2 | OXYGEN SATURATION: 96 % | SYSTOLIC BLOOD PRESSURE: 100 MMHG | TEMPERATURE: 98 F

## 2025-03-10 DIAGNOSIS — R59.1 LYMPHADENOPATHY: Primary | ICD-10-CM

## 2025-03-10 PROCEDURE — 99213 OFFICE O/P EST LOW 20 MIN: CPT | Performed by: FAMILY MEDICINE

## 2025-03-10 ASSESSMENT — ENCOUNTER SYMPTOMS
SINUS PRESSURE: 0
NAUSEA: 0
TROUBLE SWALLOWING: 0
SINUS PAIN: 0
DIARRHEA: 0
CONSTIPATION: 0
RHINORRHEA: 0
SORE THROAT: 0
VOMITING: 0
SHORTNESS OF BREATH: 0
CHEST TIGHTNESS: 0

## 2025-03-10 NOTE — PROGRESS NOTES
Mague Pinto (:  1990) is a 34 y.o. female,Established patient, here for evaluation of the following chief complaint(s):  Follow-up (Sore throat is gone. Check lymph nodes post antibiotics. )      Subjective   SUBJECTIVE/OBJECTIVE:  HPI  Patient presents with lymph node swelling for over 3 weeks  Known exposures previous problems with sore throats and swollen lymph nodes, always worse on the left side  Treatment Augmentin  Better above treatment has helped with sore throat but still with swollen lymph nodes, slightly smaller than they had been  Recent trip to [place], long flights, different foods  No difficulty with turning her head or neck pain    Review of Systems   Constitutional:  Positive for fatigue. Negative for activity change, chills and fever.   HENT:  Negative for congestion, ear pain, postnasal drip, rhinorrhea, sinus pressure, sinus pain, sneezing, sore throat and trouble swallowing.    Respiratory:  Negative for chest tightness and shortness of breath.    Cardiovascular:  Negative for chest pain and leg swelling.   Gastrointestinal:  Negative for constipation, diarrhea, nausea and vomiting.   Musculoskeletal:  Negative for arthralgias, neck pain and neck stiffness.   Skin:  Negative for rash.   Neurological:  Negative for dizziness, light-headedness and headaches.   Hematological:  Positive for adenopathy. Does not bruise/bleed easily.          Objective   Physical Exam  Vitals reviewed.   Constitutional:       General: She is not in acute distress.     Appearance: Normal appearance. She is normal weight. She is not ill-appearing.   HENT:      Head: Normocephalic and atraumatic.      Right Ear: No middle ear effusion.      Left Ear:  No middle ear effusion.      Nose: Rhinorrhea present. No mucosal edema or congestion. Rhinorrhea is clear.      Right Turbinates: Not swollen.      Left Turbinates: Not swollen.      Right Sinus: No maxillary sinus tenderness or frontal sinus tenderness.

## 2025-03-19 ENCOUNTER — HOSPITAL ENCOUNTER (OUTPATIENT)
Dept: ULTRASOUND IMAGING | Age: 35
Discharge: HOME | End: 2025-03-19
Attending: FAMILY MEDICINE
Payer: COMMERCIAL

## 2025-03-19 ENCOUNTER — PATIENT MESSAGE (OUTPATIENT)
Dept: FAMILY MEDICINE CLINIC | Age: 35
End: 2025-03-19

## 2025-03-19 DIAGNOSIS — R59.1 LYMPHADENOPATHY: ICD-10-CM

## 2025-03-19 PROCEDURE — 76536 US EXAM OF HEAD AND NECK: CPT

## 2025-05-16 ENCOUNTER — HOSPITAL ENCOUNTER (EMERGENCY)
Age: 35
Discharge: HOME | End: 2025-05-16
Attending: EMERGENCY MEDICINE
Payer: COMMERCIAL

## 2025-05-16 VITALS
HEART RATE: 58 BPM | BODY MASS INDEX: 22.96 KG/M2 | TEMPERATURE: 98.4 F | HEIGHT: 69 IN | OXYGEN SATURATION: 99 % | WEIGHT: 155 LBS | SYSTOLIC BLOOD PRESSURE: 110 MMHG | RESPIRATION RATE: 14 BRPM | DIASTOLIC BLOOD PRESSURE: 60 MMHG

## 2025-05-16 DIAGNOSIS — J02.9 ACUTE PHARYNGITIS, UNSPECIFIED ETIOLOGY: Primary | ICD-10-CM

## 2025-05-16 DIAGNOSIS — Z20.818 STREPTOCOCCUS EXPOSURE: ICD-10-CM

## 2025-05-16 PROCEDURE — 99283 EMERGENCY DEPT VISIT LOW MDM: CPT

## 2025-05-16 RX ORDER — AMOXICILLIN 250 MG/1
500 CAPSULE ORAL ONCE
Status: DISCONTINUED | OUTPATIENT
Start: 2025-05-16 | End: 2025-05-16 | Stop reason: HOSPADM

## 2025-05-16 RX ORDER — AMOXICILLIN 500 MG/1
500 CAPSULE ORAL 2 TIMES DAILY
Qty: 20 CAPSULE | Refills: 0 | Status: SHIPPED | OUTPATIENT
Start: 2025-05-16 | End: 2025-05-26

## 2025-05-16 ASSESSMENT — PAIN - FUNCTIONAL ASSESSMENT: PAIN_FUNCTIONAL_ASSESSMENT: 0-10

## 2025-05-16 ASSESSMENT — PAIN SCALES - GENERAL
PAINLEVEL_OUTOF10: 5
PAINLEVEL_OUTOF10: 5

## 2025-05-16 NOTE — ED NOTES
Pt presents to the front window with complaints of sore throat and ear pain. Her  was diagnosed with Strep A 4 days ago and she awoke this morning with symptoms. Prefers to just be treated and not swabbed.

## 2025-05-16 NOTE — DISCHARGE INSTRUCTIONS
Use Tylenol or Motrin for aches or pains.  Take amoxicillin twice a day.  Next dose tomorrow morning.  Avoid close contacts for 24 hours with antibiotics.

## 2025-05-16 NOTE — ED PROVIDER NOTES
[facility] Emergency Department  [address]  [facility] [address]  Phone: [phone]  EMERGENCY DEPARTMENT ENCOUNTER      Pt Name: Mague Pinto  MRN: [id number]  Birthdate 1990  Date of evaluation: 2025  Provider: Tam Cruz MD    CHIEF COMPLAINT       Chief Complaint   Patient presents with    Pharyngitis    Ear Pain         HISTORY OF PRESENT ILLNESS      Mague Pinto is a 34 y.o. female who presents to the emergency department with above-noted complaint.  Patient developed sore throat.  She has a history of tonsillectomy.  Denies other symptoms headache neck pain chest pain.  Her  was tested negative for COVID and flu positive for strep.        REVIEW OF SYSTEMS     Positive for sore throat.  No neck chest or abdominal pain  Review of Systems  All systems negative except as marked.     PAST MEDICAL HISTORY     Past Medical History:   Diagnosis Date    Degenerative disc disease          SURGICAL HISTORY       Past Surgical History:   Procedure Laterality Date    APPENDECTOMY  1997    BUNIONECTOMY  2014     SECTION  21 and 22    KNEE CARTILAGE SURGERY Left 2005    TONSILLECTOMY AND ADENOIDECTOMY  1994    WISDOM TOOTH EXTRACTION  2007         CURRENT MEDICATIONS       Previous Medications    No medications on file       ALLERGIES       Patient has no known allergies.    FAMILY HISTORY       Family History   Problem Relation Age of Onset    Other Mother         DDD    High Cholesterol Father     Thyroid Disease Sister         hypothyroid    Clotting Disorder Maternal Grandmother     Other Maternal Grandfather         Factor V    Cancer Maternal Grandfather         esophageal    Heart Attack Paternal Grandfather     Heart Disease Sister         murmur          SOCIAL HISTORY       Social History     Tobacco Use    Smoking status: Never    Smokeless tobacco: Never   Vaping Use    Vaping status: Never Used   Substance  Use Topics    Alcohol use: Yes     Comment: occasionally    Drug use: No         PHYSICAL EXAM           Physical Exam    VITAL SIGNS: /60   Pulse 58   Temp 98.4 °F (36.9 °C)   Resp 14   Ht 1.753 m (5' 9\")   Wt 70.3 kg (155 lb)   SpO2 99%   BMI 22.89 kg/m²    Constitutional:  Alert not toxtic or ill,   HENT:  Normocephalic, Atraumatic TMs are normal, oropharynx shows maybe some slight erythema but no severe exudate.  No other ulcerations  Cervical Spine: Normal range of motion,  No stridor.   Eyes:  No discharge or  Swelling  Respiratory: No respiratory distress  Musculoskeletal:  Intact distal pulses, No edema, No tenderness, No tenderness to palpation or major deformities noted.   Integument:  No rash (on exposed areas)   Neurologic:  alert & appropriate       DIAGNOSTIC RESULTS       EKG:      RADIOLOGY:         Interpretation per the Radiologist below, if available at the time of this note:    No orders to display         LABS:  Labs Reviewed - No data to display    All other labs were within normal range or not returned as of this dictation.      MIPS    Not applicable      EMERGENCY DEPARTMENT COURSE and DIFFERENTIAL DIAGNOSIS/MDM:   Patient has sore throat with a strep exposure.  She has absent tonsils although has pain.  Covering her with antibiotics.    1)  Number and Complexity of Problems  Problem List This Visit: Sore throat  Problem List Items Addressed This Visit    None  Visit Diagnoses         Acute pharyngitis, unspecified etiology    -  Primary      Streptococcus exposure                Differential Diagnosis: Strep, COVID, flu      2)  Data Reviewed    Imaging that is independently reviewed with the associated radiologist report, not interpreted by me are:  Not applicable    Imaging that is independently reviewed and interpreted by me as:  Not applicable        See more data below for the lab and radiology tests and orders.    3)  Treatment and Disposition    Shared Decision Making:

## 2025-06-19 ENCOUNTER — PATIENT MESSAGE (OUTPATIENT)
Dept: FAMILY MEDICINE CLINIC | Age: 35
End: 2025-06-19

## 2025-06-19 RX ORDER — CEPHALEXIN 500 MG/1
500 CAPSULE ORAL 2 TIMES DAILY
Qty: 20 CAPSULE | Refills: 0 | Status: SHIPPED | OUTPATIENT
Start: 2025-06-19 | End: 2025-06-29